# Patient Record
Sex: FEMALE | Race: WHITE | NOT HISPANIC OR LATINO | Employment: FULL TIME | URBAN - METROPOLITAN AREA
[De-identification: names, ages, dates, MRNs, and addresses within clinical notes are randomized per-mention and may not be internally consistent; named-entity substitution may affect disease eponyms.]

---

## 2019-04-20 ENCOUNTER — HOSPITAL ENCOUNTER (EMERGENCY)
Facility: HOSPITAL | Age: 59
Discharge: HOME | End: 2019-04-20
Payer: COMMERCIAL

## 2019-04-20 ENCOUNTER — APPOINTMENT (OUTPATIENT)
Dept: RADIOLOGY | Facility: HOSPITAL | Age: 59
End: 2019-04-20
Payer: COMMERCIAL

## 2019-04-20 VITALS
HEART RATE: 80 BPM | TEMPERATURE: 97.7 F | DIASTOLIC BLOOD PRESSURE: 90 MMHG | HEIGHT: 65 IN | RESPIRATION RATE: 16 BRPM | SYSTOLIC BLOOD PRESSURE: 140 MMHG | OXYGEN SATURATION: 98 % | BODY MASS INDEX: 46.48 KG/M2 | WEIGHT: 279 LBS

## 2019-04-20 DIAGNOSIS — S80.00XA: ICD-10-CM

## 2019-04-20 DIAGNOSIS — S80.02XA TRAUMATIC HEMATOMA OF LEFT KNEE, INITIAL ENCOUNTER: ICD-10-CM

## 2019-04-20 DIAGNOSIS — M25.562 ACUTE PAIN OF LEFT KNEE: Primary | ICD-10-CM

## 2019-04-20 DIAGNOSIS — S80.00XA TRAUMATIC HEMATOMA OF KNEE, INITIAL ENCOUNTER: ICD-10-CM
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
## 2019-04-20 PROCEDURE — 99283 EMERGENCY DEPT VISIT LOW MDM: CPT

## 2019-04-20 PROCEDURE — 96372 THER/PROPH/DIAG INJ SC/IM: CPT

## 2019-04-20 PROCEDURE — 73562 X-RAY EXAM OF KNEE 3: CPT | Mod: LT

## 2019-04-20 PROCEDURE — 2500000200 HC RX 250 WO HCPCS: Performed by: NURSE PRACTITIONER

## 2019-04-20 RX ORDER — MORPHINE SULFATE 4 MG/ML
8 INJECTION, SOLUTION INTRAMUSCULAR; INTRAVENOUS ONCE
Status: COMPLETED | OUTPATIENT
Start: 2019-04-20 | End: 2019-04-20

## 2019-04-20 RX ORDER — AMLODIPINE BESYLATE 10 MG/1
TABLET ORAL
COMMUNITY

## 2019-04-20 RX ORDER — HYDROCODONE BITARTRATE AND ACETAMINOPHEN 5; 325 MG/1; MG/1
1 TABLET ORAL EVERY 6 HOURS PRN
Qty: 15 TABLET | Refills: 0 | Status: SHIPPED | OUTPATIENT
Start: 2019-04-20

## 2019-04-20 RX ORDER — MULTIVITAMIN
1 TABLET ORAL DAILY
COMMUNITY

## 2019-04-20 RX ORDER — ASPIRIN 81 MG/1
81 TABLET ORAL DAILY
COMMUNITY

## 2019-04-20 RX ADMIN — MORPHINE SULFATE 8 MG: 4 INJECTION INTRAVENOUS at 18:49

## 2019-04-20 ASSESSMENT — ENCOUNTER SYMPTOMS
NECK PAIN: 0
ACTIVITY CHANGE: 1
NAUSEA: 0
STIFFNESS: 1
NECK STIFFNESS: 0
COUGH: 0
COLOR CHANGE: 1
BRUISES/BLEEDS EASILY: 0
MUSCLE WEAKNESS: 0
VOMITING: 0
JOINT SWELLING: 1
ARTHRALGIAS: 1
SHORTNESS OF BREATH: 0

## 2019-04-20 ASSESSMENT — PAIN DESCRIPTION - DESCRIPTORS: DESCRIPTORS: ACHING

## 2019-04-21 NOTE — ED PROVIDER NOTES
"    HPI:  Chief Complaint   Patient presents with   • Knee Pain     Pt presents to triage with knee pain after tripping \"over my own foot\" and landing on knees. Denies other injury or LOC       58-year-old female presents with pain left knee.  [place] her own feet and fell forward landing on the both knees.  This occurred approximately 2 to 3 hours ago.  Pain is becoming increasingly intense and severe.  She describes this as a pressure sensation with decreased range of motion.  She denies hitting her head or other injury.  Denies other associated symptoms.  Has used ice with attempted alleviation of symptoms.  She denies radiation of discomfort.        History provided by:  Patient  Knee Pain   Location:  Knee  Time since incident:  2 hours  Injury: yes    Mechanism of injury: fall    Fall:     Fall occurred:  Tripped    Height of fall:  Ground fall    Impact surface:  Carpet    Point of impact:  Knees  Knee location:  L knee  Pain details:     Quality:  Throbbing    Radiates to:  Does not radiate    Severity:  Severe    Onset quality:  Sudden    Duration:  2 hours    Timing:  Constant    Progression:  Worsening  Chronicity:  New  Prior injury to area:  No  Relieved by:  None tried  Worsened by:  Bearing weight, extension and flexion  Ineffective treatments:  None tried  Associated symptoms: stiffness, swelling and tingling    Associated symptoms: no muscle weakness and no neck pain        HISTORY:  Past Medical History:   Diagnosis Date   • Hypertension        Past Surgical History:   Procedure Laterality Date   • HYSTERECTOMY         No family history on file.    Social History     Tobacco Use   • Smoking status: Never Smoker   Substance Use Topics   • Alcohol use: Yes     Comment: rare   • Drug use: Never         ROS:  Review of Systems   Constitutional: Positive for activity change.   Respiratory: Negative for cough and shortness of breath.  "   Cardiovascular: Negative for chest pain.   Gastrointestinal: Negative for nausea and vomiting.   Musculoskeletal: Positive for arthralgias, joint swelling and stiffness. Negative for neck pain and neck stiffness.        Left knee pain   Skin: Positive for color change.   Hematological: Does not bruise/bleed easily.       PE:  ED Triage Vitals   Temp Heart Rate Resp BP SpO2   04/20/19 1815 04/20/19 1815 04/20/19 1815 04/20/19 1815 04/20/19 1815   36.5 °C (97.7 °F) 81 15 142/93 98 %      Temp Source Heart Rate Source Patient Position BP Location FiO2 (%)   04/20/19 1815 -- 04/20/19 2043 -- --   Oral  Sitting         Physical Exam   Constitutional: She is oriented to person, place, and time. She appears well-developed and well-nourished. No distress.   HENT:   Head: Normocephalic and atraumatic.   Eyes: Conjunctivae are normal.   Neck: Neck supple.   Cardiovascular: Normal rate and regular rhythm.   No murmur heard.  Pulmonary/Chest: Effort normal and breath sounds normal. No respiratory distress.   Abdominal: Soft. There is no tenderness.   Musculoskeletal: She exhibits no edema.        Left knee: She exhibits decreased range of motion, swelling, effusion, ecchymosis and deformity. She exhibits normal patellar mobility. Tenderness found.   Neurological: She is alert and oriented to person, place, and time.   Skin: Skin is warm and dry.   Psychiatric: She has a normal mood and affect.   Nursing note and vitals reviewed.      ED LABS:  Labs Reviewed - No data to display      ED IMAGES:  X-ray knee 3 views left   Final Result   Impression:   1.  No acute fracture. Marked soft tissue swelling and apparent inflammation around the knee joint. There is possible prepatellar/pre-infrapatellar bursitis. A soft tissue mass is not excluded in this region. MRI could definitively evaluate.   2.  Degenerative changes.          ED PROCEDURES:  Procedures    ED COURSE:  Clinical Impressions as of Apr 20 2102   Acute pain of left  knee   Contusion of knee with skin surface intact   Fall (on)(from) incline, initial encounter   Traumatic hematoma of left knee, initial encounter   Traumatic hematoma of knee, initial encounter     Range of motion increased after morphine.  She is able to flex and extend indicating a patellar tendon is intact.  X-ray shows a significant hematoma and soft tissue injury without bony abnormality.  Patient will be discharged with knee immobilizer, walker and pain medications.  She is strongly encouraged to follow with PCP upon return home to walk [place] on Wednesday.  States understanding is in agreement with this plan.       MDM:  MDM  58-year-old female presents with acute pain and swelling of the left knee with significant bruising, after a fall.  She does have decreased range of motion.  Pain medication will be provided an x-ray.  Final diagnoses:   [M25.562] Acute pain of left knee   [S80.00XA] Contusion of knee with skin surface intact   [W10.2XXA] Fall (on)(from) incline, initial encounter   [S80.02XA] Traumatic hematoma of left knee, initial encounter   [S80.00XA] Traumatic hematoma of knee, initial encounter        Lolly Goins, REY  04/20/19 [id number]

## 2019-04-21 NOTE — DISCHARGE INSTRUCTIONS
Rest extremity for at least 2 days, then resume activities as tolerated.  Ice for 20 minutes every 2 hours while awake for the next 2 days. May use thereafter as needed for comfort.  Compression with ACE, splint, or gel splint.  Elevation of affected extremity as much as able

## 2024-08-20 PROCEDURE — 93016 CV STRESS TEST SUPVJ ONLY: CPT | Performed by: INTERNAL MEDICINE

## 2024-08-20 PROCEDURE — 93018 CV STRESS TEST I&R ONLY: CPT | Performed by: INTERNAL MEDICINE

## 2024-08-20 PROCEDURE — 78452 HT MUSCLE IMAGE SPECT MULT: CPT | Performed by: INTERNAL MEDICINE
